# Patient Record
Sex: MALE | Race: WHITE | NOT HISPANIC OR LATINO | Employment: OTHER | URBAN - METROPOLITAN AREA
[De-identification: names, ages, dates, MRNs, and addresses within clinical notes are randomized per-mention and may not be internally consistent; named-entity substitution may affect disease eponyms.]

---

## 2024-12-23 ENCOUNTER — HOSPITAL ENCOUNTER (EMERGENCY)
Facility: HOSPITAL | Age: 69
Discharge: HOME | End: 2024-12-27
Attending: EMERGENCY MEDICINE
Payer: MEDICARE

## 2024-12-23 ENCOUNTER — APPOINTMENT (OUTPATIENT)
Dept: RADIOLOGY | Facility: HOSPITAL | Age: 69
End: 2024-12-23
Payer: MEDICARE

## 2024-12-23 ENCOUNTER — APPOINTMENT (OUTPATIENT)
Dept: CARDIOLOGY | Facility: HOSPITAL | Age: 69
End: 2024-12-23
Payer: MEDICARE

## 2024-12-23 DIAGNOSIS — R45.851 DEPRESSION WITH SUICIDAL IDEATION: Primary | ICD-10-CM

## 2024-12-23 DIAGNOSIS — F32.A DEPRESSION WITH SUICIDAL IDEATION: Primary | ICD-10-CM

## 2024-12-23 DIAGNOSIS — G89.29 OTHER CHRONIC PAIN: ICD-10-CM

## 2024-12-23 LAB
ALBUMIN SERPL BCP-MCNC: 4.4 G/DL (ref 3.4–5)
ALP SERPL-CCNC: 85 U/L (ref 33–136)
ALT SERPL W P-5'-P-CCNC: 17 U/L (ref 10–52)
ANION GAP SERPL CALC-SCNC: 15 MMOL/L (ref 10–20)
APAP SERPL-MCNC: <10 UG/ML
AST SERPL W P-5'-P-CCNC: 14 U/L (ref 9–39)
BASOPHILS # BLD AUTO: 0.09 X10*3/UL (ref 0–0.1)
BASOPHILS NFR BLD AUTO: 0.6 %
BILIRUB SERPL-MCNC: 0.2 MG/DL (ref 0–1.2)
BUN SERPL-MCNC: 10 MG/DL (ref 6–23)
CALCIUM SERPL-MCNC: 10.1 MG/DL (ref 8.6–10.3)
CHLORIDE SERPL-SCNC: 101 MMOL/L (ref 98–107)
CO2 SERPL-SCNC: 27 MMOL/L (ref 21–32)
CREAT SERPL-MCNC: 0.73 MG/DL (ref 0.5–1.3)
EGFRCR SERPLBLD CKD-EPI 2021: >90 ML/MIN/1.73M*2
EOSINOPHIL # BLD AUTO: 0.23 X10*3/UL (ref 0–0.7)
EOSINOPHIL NFR BLD AUTO: 1.5 %
ERYTHROCYTE [DISTWIDTH] IN BLOOD BY AUTOMATED COUNT: 15 % (ref 11.5–14.5)
ETHANOL SERPL-MCNC: <10 MG/DL
GLUCOSE SERPL-MCNC: 67 MG/DL (ref 74–99)
HCT VFR BLD AUTO: 46.2 % (ref 41–52)
HGB BLD-MCNC: 15.2 G/DL (ref 13.5–17.5)
IMM GRANULOCYTES # BLD AUTO: 0.09 X10*3/UL (ref 0–0.7)
IMM GRANULOCYTES NFR BLD AUTO: 0.6 % (ref 0–0.9)
LYMPHOCYTES # BLD AUTO: 4.11 X10*3/UL (ref 1.2–4.8)
LYMPHOCYTES NFR BLD AUTO: 27.3 %
MCH RBC QN AUTO: 28.5 PG (ref 26–34)
MCHC RBC AUTO-ENTMCNC: 32.9 G/DL (ref 32–36)
MCV RBC AUTO: 87 FL (ref 80–100)
MONOCYTES # BLD AUTO: 1.49 X10*3/UL (ref 0.1–1)
MONOCYTES NFR BLD AUTO: 9.9 %
NEUTROPHILS # BLD AUTO: 9.03 X10*3/UL (ref 1.2–7.7)
NEUTROPHILS NFR BLD AUTO: 60.1 %
NRBC BLD-RTO: 0 /100 WBCS (ref 0–0)
PLATELET # BLD AUTO: 510 X10*3/UL (ref 150–450)
POTASSIUM SERPL-SCNC: 3.9 MMOL/L (ref 3.5–5.3)
PROT SERPL-MCNC: 8.1 G/DL (ref 6.4–8.2)
RBC # BLD AUTO: 5.34 X10*6/UL (ref 4.5–5.9)
SALICYLATES SERPL-MCNC: <3 MG/DL
SARS-COV-2 RNA RESP QL NAA+PROBE: DETECTED
SODIUM SERPL-SCNC: 139 MMOL/L (ref 136–145)
WBC # BLD AUTO: 15 X10*3/UL (ref 4.4–11.3)

## 2024-12-23 PROCEDURE — 2500000001 HC RX 250 WO HCPCS SELF ADMINISTERED DRUGS (ALT 637 FOR MEDICARE OP): Performed by: NURSE PRACTITIONER

## 2024-12-23 PROCEDURE — 99285 EMERGENCY DEPT VISIT HI MDM: CPT | Mod: 25 | Performed by: EMERGENCY MEDICINE

## 2024-12-23 PROCEDURE — 87635 SARS-COV-2 COVID-19 AMP PRB: CPT | Performed by: NURSE PRACTITIONER

## 2024-12-23 PROCEDURE — 93005 ELECTROCARDIOGRAM TRACING: CPT

## 2024-12-23 PROCEDURE — 71046 X-RAY EXAM CHEST 2 VIEWS: CPT | Performed by: RADIOLOGY

## 2024-12-23 PROCEDURE — 80320 DRUG SCREEN QUANTALCOHOLS: CPT | Performed by: NURSE PRACTITIONER

## 2024-12-23 PROCEDURE — 80053 COMPREHEN METABOLIC PANEL: CPT | Performed by: NURSE PRACTITIONER

## 2024-12-23 PROCEDURE — 96372 THER/PROPH/DIAG INJ SC/IM: CPT | Performed by: EMERGENCY MEDICINE

## 2024-12-23 PROCEDURE — 71046 X-RAY EXAM CHEST 2 VIEWS: CPT

## 2024-12-23 PROCEDURE — 85025 COMPLETE CBC W/AUTO DIFF WBC: CPT | Performed by: NURSE PRACTITIONER

## 2024-12-23 PROCEDURE — 36415 COLL VENOUS BLD VENIPUNCTURE: CPT | Performed by: NURSE PRACTITIONER

## 2024-12-23 PROCEDURE — 2500000004 HC RX 250 GENERAL PHARMACY W/ HCPCS (ALT 636 FOR OP/ED): Performed by: EMERGENCY MEDICINE

## 2024-12-23 RX ORDER — HYDROXYZINE HYDROCHLORIDE 25 MG/1
25 TABLET, FILM COATED ORAL ONCE
Status: COMPLETED | OUTPATIENT
Start: 2024-12-23 | End: 2024-12-23

## 2024-12-23 RX ORDER — IBUPROFEN 600 MG/1
600 TABLET ORAL ONCE
Status: COMPLETED | OUTPATIENT
Start: 2024-12-23 | End: 2024-12-23

## 2024-12-23 RX ORDER — ACETAMINOPHEN 325 MG/1
975 TABLET ORAL ONCE
Status: COMPLETED | OUTPATIENT
Start: 2024-12-23 | End: 2024-12-23

## 2024-12-23 RX ORDER — HYDROMORPHONE HYDROCHLORIDE 1 MG/ML
1 INJECTION, SOLUTION INTRAMUSCULAR; INTRAVENOUS; SUBCUTANEOUS ONCE
Status: COMPLETED | OUTPATIENT
Start: 2024-12-23 | End: 2024-12-23

## 2024-12-23 RX ADMIN — HYDROMORPHONE HYDROCHLORIDE 1 MG: 1 INJECTION, SOLUTION INTRAMUSCULAR; INTRAVENOUS; SUBCUTANEOUS at 18:36

## 2024-12-23 RX ADMIN — HYDROXYZINE HYDROCHLORIDE 25 MG: 25 TABLET ORAL at 14:19

## 2024-12-23 RX ADMIN — ACETAMINOPHEN 975 MG: 325 TABLET ORAL at 13:23

## 2024-12-23 RX ADMIN — IBUPROFEN 600 MG: 600 TABLET, FILM COATED ORAL at 13:23

## 2024-12-23 SDOH — HEALTH STABILITY: MENTAL HEALTH: RISK OF SUICIDE: HIGH RISK

## 2024-12-23 SDOH — HEALTH STABILITY: MENTAL HEALTH: CONTENT: BLAMING SELF

## 2024-12-23 SDOH — HEALTH STABILITY: MENTAL HEALTH: BEHAVIORS/MOOD: ANXIOUS

## 2024-12-23 SDOH — SOCIAL STABILITY: SOCIAL NETWORK: VISITOR BEHAVIORS: UNABLE TO ASSESS

## 2024-12-23 SDOH — HEALTH STABILITY: MENTAL HEALTH: HAVE YOU EVER DONE ANYTHING, STARTED TO DO ANYTHING, OR PREPARED TO DO ANYTHING TO END YOUR LIFE?: YES

## 2024-12-23 SDOH — HEALTH STABILITY: MENTAL HEALTH: HAVE YOU BEEN THINKING ABOUT HOW YOU MIGHT DO THIS?: YES

## 2024-12-23 SDOH — HEALTH STABILITY: MENTAL HEALTH

## 2024-12-23 SDOH — HEALTH STABILITY: MENTAL HEALTH: NEEDS EXPRESSED: EMOTIONAL

## 2024-12-23 SDOH — SOCIAL STABILITY: SOCIAL INSECURITY: FAMILY BEHAVIORS: UNABLE TO ASSESS

## 2024-12-23 SDOH — HEALTH STABILITY: MENTAL HEALTH: HAVE YOU HAD THESE THOUGHTS AND HAD SOME INTENTION OF ACTING ON THEM?: YES

## 2024-12-23 SDOH — HEALTH STABILITY: MENTAL HEALTH: BEHAVIORS/MOOD: CALM;COOPERATIVE

## 2024-12-23 SDOH — HEALTH STABILITY: MENTAL HEALTH: BEHAVIORAL HEALTH(WDL): EXCEPTIONS TO WDL

## 2024-12-23 SDOH — HEALTH STABILITY: MENTAL HEALTH: SLEEP PATTERN: UNABLE TO ASSESS

## 2024-12-23 SDOH — HEALTH STABILITY: MENTAL HEALTH: BEHAVIORS/MOOD: AGITATED;ANXIOUS

## 2024-12-23 SDOH — ECONOMIC STABILITY: HOUSING INSECURITY: FEELS SAFE LIVING IN HOME: YES

## 2024-12-23 SDOH — HEALTH STABILITY: MENTAL HEALTH: HAVE YOU WISHED YOU WERE DEAD OR WISHED YOU COULD GO TO SLEEP AND NOT WAKE UP?: YES

## 2024-12-23 SDOH — HEALTH STABILITY: MENTAL HEALTH: ANXIETY SYMPTOMS: GENERALIZED

## 2024-12-23 SDOH — HEALTH STABILITY: MENTAL HEALTH: DELUSIONS: OTHER (COMMENT)

## 2024-12-23 SDOH — HEALTH STABILITY: MENTAL HEALTH: BEHAVIORS/MOOD: CALM;SAD;COOPERATIVE

## 2024-12-23 SDOH — HEALTH STABILITY: MENTAL HEALTH: CONTENT: UNREMARKABLE

## 2024-12-23 SDOH — HEALTH STABILITY: MENTAL HEALTH: SUICIDE ASSESSMENT: ADULT (C-SSRS)

## 2024-12-23 SDOH — HEALTH STABILITY: MENTAL HEALTH: WAS THIS WITHIN THE PAST THREE MONTHS?: YES

## 2024-12-23 SDOH — HEALTH STABILITY: MENTAL HEALTH
HAVE YOU STARTED TO WORK OUT OR WORKED OUT THE DETAILS OF HOW TO KILL YOURSELF? DO YOU INTENT TO CARRY OUT THIS PLAN?: YES

## 2024-12-23 SDOH — SOCIAL STABILITY: SOCIAL NETWORK: EMOTIONAL SUPPORT GIVEN: REASSURE

## 2024-12-23 SDOH — HEALTH STABILITY: MENTAL HEALTH: IN THE PAST FEW WEEKS, HAVE YOU WISHED YOU WERE DEAD?: YES

## 2024-12-23 SDOH — HEALTH STABILITY: MENTAL HEALTH: BEHAVIORS/MOOD: ANXIOUS;COOPERATIVE;PACING

## 2024-12-23 SDOH — HEALTH STABILITY: MENTAL HEALTH: HAVE YOU ACTUALLY HAD ANY THOUGHTS OF KILLING YOURSELF?: YES

## 2024-12-23 SDOH — SOCIAL STABILITY: SOCIAL NETWORK: EMOTIONAL SUPPORT GIVEN: PATIENT AND FAMILY COUNSELING

## 2024-12-23 SDOH — HEALTH STABILITY: MENTAL HEALTH
OTHER SUICIDE PRECAUTIONS INCLUDE: PATIENT PLACED IN AN EASILY OBSERVABLE ROOM WITH DOOR/CURTAIN REMAINING OPEN;PATIENT PLACED IN GOWN (SNAPS OR PAPER GOWNS PREFERRED) AND WANDED;PATIENT PLACED IN PSYCH SAFE ROOM (IF AVAILABLE);PROVIDER NOTIFIED;HOME MEDICATION LIST COLLECTED AND SHARED W

## 2024-12-23 SDOH — HEALTH STABILITY: MENTAL HEALTH: WISH TO BE DEAD (PAST 1 MONTH): YES

## 2024-12-23 SDOH — HEALTH STABILITY: MENTAL HEALTH: BEHAVIORS/MOOD: ANXIOUS;COOPERATIVE

## 2024-12-23 SDOH — HEALTH STABILITY: MENTAL HEALTH: FOR HIGH RISK PATIENTS: 1:1 PATIENT OBSERVER AT ALL TIMES

## 2024-12-23 SDOH — ECONOMIC STABILITY: GENERAL

## 2024-12-23 SDOH — HEALTH STABILITY: MENTAL HEALTH
DEPRESSION SYMPTOMS: APPETITE CHANGE;CHANGE IN ENERGY LEVEL;FEELINGS OF HELPLESSNESS;FEELINGS OF HOPELESSESS;FEELINGS OF WORTHLESSNESS;INCREASED IRRITABILITY;ISOLATIVE;LOSS OF INTEREST;SLEEP DISTURBANCE

## 2024-12-23 ASSESSMENT — LIFESTYLE VARIABLES
TOTAL SCORE: 0
EVER FELT BAD OR GUILTY ABOUT YOUR DRINKING: NO
PRESCIPTION_ABUSE_PAST_12_MONTHS: NO
HAVE YOU EVER FELT YOU SHOULD CUT DOWN ON YOUR DRINKING: NO
HAVE PEOPLE ANNOYED YOU BY CRITICIZING YOUR DRINKING: NO
EVER HAD A DRINK FIRST THING IN THE MORNING TO STEADY YOUR NERVES TO GET RID OF A HANGOVER: NO
SUBSTANCE_ABUSE_PAST_12_MONTHS: NO

## 2024-12-23 ASSESSMENT — PAIN - FUNCTIONAL ASSESSMENT
PAIN_FUNCTIONAL_ASSESSMENT: 0-10

## 2024-12-23 ASSESSMENT — PAIN DESCRIPTION - LOCATION
LOCATION: BACK

## 2024-12-23 ASSESSMENT — PAIN DESCRIPTION - PROGRESSION: CLINICAL_PROGRESSION: NOT CHANGED

## 2024-12-23 ASSESSMENT — COLUMBIA-SUICIDE SEVERITY RATING SCALE - C-SSRS
4. HAVE YOU HAD THESE THOUGHTS AND HAD SOME INTENTION OF ACTING ON THEM?: YES
2. HAVE YOU ACTUALLY HAD ANY THOUGHTS OF KILLING YOURSELF?: YES
6. HAVE YOU EVER DONE ANYTHING, STARTED TO DO ANYTHING, OR PREPARED TO DO ANYTHING TO END YOUR LIFE?: YES
5. HAVE YOU STARTED TO WORK OUT OR WORKED OUT THE DETAILS OF HOW TO KILL YOURSELF? DO YOU INTEND TO CARRY OUT THIS PLAN?: YES
6. HAVE YOU EVER DONE ANYTHING, STARTED TO DO ANYTHING, OR PREPARED TO DO ANYTHING TO END YOUR LIFE?: YES
1. IN THE PAST MONTH, HAVE YOU WISHED YOU WERE DEAD OR WISHED YOU COULD GO TO SLEEP AND NOT WAKE UP?: YES

## 2024-12-23 ASSESSMENT — PAIN DESCRIPTION - PAIN TYPE
TYPE: CHRONIC PAIN

## 2024-12-23 ASSESSMENT — PAIN DESCRIPTION - ORIENTATION: ORIENTATION: MID

## 2024-12-23 ASSESSMENT — PAIN SCALES - GENERAL
PAINLEVEL_OUTOF10: 7
PAINLEVEL_OUTOF10: 8
PAINLEVEL_OUTOF10: 10 - WORST POSSIBLE PAIN
PAINLEVEL_OUTOF10: 6

## 2024-12-23 NOTE — ED TRIAGE NOTES
Pt. Arrived to the ED via EMS for Suicidal ideation. Pt. Was at [facility] urgent care he experienced that he has a plan to cut his throat. He states he would go to the fire station and ( Do what I need to do). Pt. States he has a past history of multiple suicide attempts. Pt. Also states thatt he has not been able to sleep recently

## 2024-12-23 NOTE — ED PROVIDER NOTES
Chief Complaint   Patient presents with    Suicidal       HPI       69 year old male presents to the Emergency Department today complaining of suicidal ideation. Was speaking with his psychiatric care provider and mentioned that he has been having suicidal thoughts with the plan to slit his throat. Reports that his insomnia has triggered increasing depression and these thoughts. Denies any associated fever, chills, headache, neck pain, chest pain, shortness of breath, abdominal pain, nausea, vomiting, diarrhea, constipation, or urinary symptoms.       History provided by:  Patient             Patient History   No past medical history on file.  No past surgical history on file.  No family history on file.  Social History     Tobacco Use    Smoking status: Not on file    Smokeless tobacco: Not on file   Substance Use Topics    Alcohol use: Not on file    Drug use: Not on file           Physical Exam  Constitutional:       Appearance: Normal appearance.   HENT:      Head: Normocephalic.      Right Ear: External ear normal.      Left Ear: External ear normal.      Nose: Nose normal.      Mouth/Throat:      Mouth: Mucous membranes are moist.      Pharynx: Oropharynx is clear. No oropharyngeal exudate or posterior oropharyngeal erythema.   Eyes:      Conjunctiva/sclera: Conjunctivae normal.      Pupils: Pupils are equal, round, and reactive to light.   Cardiovascular:      Rate and Rhythm: Normal rate and regular rhythm.      Pulses:           Radial pulses are 3+ on the right side and 3+ on the left side.        Dorsalis pedis pulses are 3+ on the right side and 3+ on the left side.      Heart sounds: Normal heart sounds. No murmur heard.     No friction rub. No gallop.   Pulmonary:      Effort: Pulmonary effort is normal. No respiratory distress.      Breath sounds: Normal breath sounds. No wheezing, rhonchi or rales.   Abdominal:      General: Abdomen is flat. Bowel sounds are normal.      Palpations: Abdomen is soft.       Tenderness: There is no abdominal tenderness. There is no right CVA tenderness, left CVA tenderness, guarding or rebound. Negative signs include Gamboa's sign and McBurney's sign.   Musculoskeletal:         General: No swelling or deformity.      Cervical back: Full passive range of motion without pain.      Right lower leg: No edema.      Left lower leg: No edema.   Lymphadenopathy:      Cervical: No cervical adenopathy.   Skin:     Capillary Refill: Capillary refill takes less than 2 seconds.      Coloration: Skin is not jaundiced.      Findings: No rash.   Neurological:      General: No focal deficit present.      Mental Status: He is alert and oriented to person, place, and time. Mental status is at baseline.      Gait: Gait is intact.   Psychiatric:         Mood and Affect: Mood normal.         Behavior: Behavior is cooperative.         Labs Reviewed - No data to display    No orders to display            ED Course & MDM   ED Course as of 12/23/24 2134   Mon Dec 23, 2024   1133 Noted to have an elevated WBC count. CXR ordered.  [JZ]      ED Course User Index  [JZ] ELENA Chang-CNP         Diagnoses as of 12/23/24 2134   Depression with suicidal ideation           Medical Decision Making  EKG interpreted by Dr. Zimmerman. Indication: medical clearance. Findings: NSR with a ventricular rate of 85, normal axis, normal intervals, and no acute ischemic or injury pattern. Impression: No acute pathology.       Patient was seen and evaluated by Dr. Zimmerman. Pink slip precautions were maintained throughout his ED stay. Given Tylenol and Motrin for his chronic pain. Dr. Zimmerman later ordered him an injection of Dilaudid. Given Vistaril for his anxiety. He was intermittently agitated initially, but seemed to calm down over time. Noted to have an elevated WBC count of 15.0. Remainder of blood counts, electrolytes, liver function, and kidney function were unremarkable. Blood toxicology was negative Awaiting urine  for urinalysis and drug tox. CXR showed no acute cardiopulmonary process radiographically. COVID was positive. EKG showed no acute pathology. We find no underlying medical emergency that would prevent further psychiatric evaluation and care. Therefore, the patient is medically cleared. EPAT was consulted for such.     Diagnostic Impression:    1. Depression with suicidal ideation           Your medication list      You have not been prescribed any medications.           Procedure  Procedures     Anuel Harley, ELENA-CIRO  12/23/24 2152       ELENA Chang-CIRO  12/23/24 2156

## 2024-12-24 PROCEDURE — 2500000002 HC RX 250 W HCPCS SELF ADMINISTERED DRUGS (ALT 637 FOR MEDICARE OP, ALT 636 FOR OP/ED): Performed by: STUDENT IN AN ORGANIZED HEALTH CARE EDUCATION/TRAINING PROGRAM

## 2024-12-24 RX ORDER — DOXEPIN HYDROCHLORIDE 25 MG/1
50 CAPSULE ORAL ONCE
Status: COMPLETED | OUTPATIENT
Start: 2024-12-24 | End: 2024-12-24

## 2024-12-24 RX ADMIN — DOXEPIN HYDROCHLORIDE 50 MG: 25 CAPSULE ORAL at 05:24

## 2024-12-24 SDOH — HEALTH STABILITY: MENTAL HEALTH

## 2024-12-24 SDOH — HEALTH STABILITY: MENTAL HEALTH: NEEDS EXPRESSED: EMOTIONAL

## 2024-12-24 SDOH — HEALTH STABILITY: MENTAL HEALTH: BEHAVIORAL HEALTH(WDL): WITHIN DEFINED LIMITS

## 2024-12-24 SDOH — HEALTH STABILITY: MENTAL HEALTH: IN THE PAST FEW WEEKS, HAVE YOU WISHED YOU WERE DEAD?: YES

## 2024-12-24 SDOH — SOCIAL STABILITY: SOCIAL INSECURITY: FAMILY BEHAVIORS: UNABLE TO ASSESS

## 2024-12-24 SDOH — SOCIAL STABILITY: SOCIAL NETWORK: VISITOR BEHAVIORS: UNABLE TO ASSESS

## 2024-12-24 SDOH — HEALTH STABILITY: MENTAL HEALTH: SLEEP PATTERN: NAPS DURING THE DAY

## 2024-12-24 SDOH — HEALTH STABILITY: MENTAL HEALTH: NEEDS EXPRESSED: DENIES

## 2024-12-24 SDOH — HEALTH STABILITY: MENTAL HEALTH: SLEEP PATTERN: DIFFICULTY FALLING ASLEEP

## 2024-12-24 SDOH — HEALTH STABILITY: MENTAL HEALTH: HAVE YOU ACTUALLY HAD ANY THOUGHTS OF KILLING YOURSELF?: YES

## 2024-12-24 SDOH — HEALTH STABILITY: MENTAL HEALTH: BEHAVIORS/MOOD: COOPERATIVE;SAD

## 2024-12-24 SDOH — HEALTH STABILITY: MENTAL HEALTH: HAVE YOU HAD THESE THOUGHTS AND HAD SOME INTENTION OF ACTING ON THEM?: NO

## 2024-12-24 SDOH — HEALTH STABILITY: MENTAL HEALTH: CONTENT: UNREMARKABLE

## 2024-12-24 SDOH — HEALTH STABILITY: MENTAL HEALTH
HAVE YOU STARTED TO WORK OUT OR WORKED OUT THE DETAILS OF HOW TO KILL YOURSELF? DO YOU INTENT TO CARRY OUT THIS PLAN?: NO

## 2024-12-24 SDOH — HEALTH STABILITY: MENTAL HEALTH: BEHAVIORAL HEALTH(WDL): EXCEPTIONS TO WDL

## 2024-12-24 SDOH — HEALTH STABILITY: MENTAL HEALTH: BEHAVIORS/MOOD: SLEEPING

## 2024-12-24 SDOH — HEALTH STABILITY: MENTAL HEALTH: RISK OF SUICIDE: HIGH RISK

## 2024-12-24 SDOH — HEALTH STABILITY: MENTAL HEALTH: WAS THIS WITHIN THE PAST THREE MONTHS?: YES

## 2024-12-24 SDOH — SOCIAL STABILITY: SOCIAL NETWORK: EMOTIONAL SUPPORT GIVEN: REASSURE

## 2024-12-24 SDOH — HEALTH STABILITY: MENTAL HEALTH: SUICIDE ASSESSMENT: ADULT (C-SSRS)

## 2024-12-24 SDOH — HEALTH STABILITY: MENTAL HEALTH: HAVE YOU BEEN THINKING ABOUT HOW YOU MIGHT DO THIS?: YES

## 2024-12-24 SDOH — HEALTH STABILITY: MENTAL HEALTH: HAVE YOU EVER DONE ANYTHING, STARTED TO DO ANYTHING, OR PREPARED TO DO ANYTHING TO END YOUR LIFE?: YES

## 2024-12-24 SDOH — HEALTH STABILITY: MENTAL HEALTH: HAVE YOU WISHED YOU WERE DEAD OR WISHED YOU COULD GO TO SLEEP AND NOT WAKE UP?: YES

## 2024-12-24 SDOH — HEALTH STABILITY: MENTAL HEALTH: SLEEP PATTERN: UNABLE TO ASSESS

## 2024-12-24 SDOH — HEALTH STABILITY: MENTAL HEALTH
OTHER SUICIDE PRECAUTIONS INCLUDE: PATIENT PLACED IN AN EASILY OBSERVABLE ROOM WITH DOOR/CURTAIN REMAINING OPEN;PATIENT PLACED IN GOWN (SNAPS OR PAPER GOWNS PREFERRED) AND WANDED;PATIENT PLACED IN PSYCH SAFE ROOM (IF AVAILABLE);FREQUENT ROUNDING WITH IRREGULAR CHECKS AT MINIMUM OF EVERY 1

## 2024-12-24 SDOH — HEALTH STABILITY: MENTAL HEALTH: FOR HIGH RISK PATIENTS: ALL INTERVENTIONS ABOVE, PLUS:;1:1 PATIENT OBSERVER AT ALL TIMES

## 2024-12-24 SDOH — HEALTH STABILITY: MENTAL HEALTH: FOR HIGH RISK PATIENTS: 1:1 PATIENT OBSERVER AT ALL TIMES

## 2024-12-24 SDOH — HEALTH STABILITY: MENTAL HEALTH: SLEEP PATTERN: INSOMNIA

## 2024-12-24 SDOH — HEALTH STABILITY: MENTAL HEALTH: DELUSIONS: OTHER (COMMENT)

## 2024-12-24 SDOH — HEALTH STABILITY: MENTAL HEALTH: BEHAVIORS/MOOD: COOPERATIVE;CALM

## 2024-12-24 SDOH — HEALTH STABILITY: MENTAL HEALTH: BEHAVIORS/MOOD: CALM;COOPERATIVE

## 2024-12-24 ASSESSMENT — COLUMBIA-SUICIDE SEVERITY RATING SCALE - C-SSRS
6. HAVE YOU EVER DONE ANYTHING, STARTED TO DO ANYTHING, OR PREPARED TO DO ANYTHING TO END YOUR LIFE?: NO
1. SINCE LAST CONTACT, HAVE YOU WISHED YOU WERE DEAD OR WISHED YOU COULD GO TO SLEEP AND NOT WAKE UP?: YES
2. HAVE YOU ACTUALLY HAD ANY THOUGHTS OF KILLING YOURSELF?: NO

## 2024-12-24 NOTE — PROGRESS NOTES
Emergency Medicine Transition of Care Note.    I received Herbert Keane in signout from Dr. Zimmerman.  Please see the previous ED provider note for all HPI, PE and MDM up to the time of signout at 00:30. This is in addition to the primary record.    In brief Herbert Keane is an 69 y.o. male presenting for   Chief Complaint   Patient presents with    Suicidal     At the time of signout we were awaiting: Placement    ED Course as of 12/24/24 0534   Mon Dec 23, 2024   1133 Noted to have an elevated WBC count. CXR ordered.  [JZ]   2307 [facility] for his suicidal ideation with plan [SG]      ED Course User Index  [JZ] Anuel Harley, APRN-CNP  [SG] Susanna Zimmerman MD         Diagnoses as of 12/24/24 0534   Depression with suicidal ideation       Medical Decision Making  Patient was signed out to me pending placement by EPAT.  During my shift the patient remained calm and cooperative.  He was requesting something to help him sleep and so doxepin was ordered.  At the time of signout the patient is pending placement    Final diagnoses:   [F32.A, R45.851] Depression with suicidal ideation           Procedure  Procedures    Joao Miranda MD

## 2024-12-24 NOTE — PROGRESS NOTES
"EPAT - Social Work Psychiatric Assessment    Arrival Details  Mode of Arrival: Ambulance  Admission Source: Home  Admission Type: Involuntary  EPAT Assessment Start Date: 12/23/24  EPAT Assessment Start Time: 2131  Name of : ANIBAL Simmons    History of Present Illness  ED Admission Reason: Psychiatric assessment ordered for suicidal risk  HPI: A review of previous and current electronic records was conducted prior to the patient interview. EMR was quite limited, only showing prescription history and brief outpatient psychiatric notes. No history of past suicidal attempts, but does show one expert evaluation before an inpatient hospitalization for suicidal ideation 11/4/19 (details in summary section).   Circumstance in which the patient has presented to the ED and initial clinical impressions as documented in the ED Provider Note upon admission:  69 year old male presents to the Emergency Department today complaining of suicidal ideation. Was speaking with his psychiatric care provider [Meryl PARKER ]and mentioned that he has been having suicidal thoughts with the plan to slit his throat. Reports that his insomnia has triggered increasing depression and these thoughts.   Medical Decision Making provider section of note: Given Vistaril for his anxiety. He was intermittently agitated initially, but seemed to calm down over time. Behavior: Behavior is cooperative.    RN note upon admission: Pt. Arrived to the ED via EMS for Suicidal ideation. Pt. Was at [facility] urgent care he experienced that he has a plan to cut his throat. He states he would go to the fire station and \"Do what I need to do.\" Pt. States he has a past history of multiple suicide attempts. Pt. Also states that he has not been able to sleep recently.    SW Readmission Information   Readmission within 30 Days: No    Psychiatric Symptoms    Generalized Anxiety Disorder: Difficult to control worry, Difficulity concentrating, Excessive " "anxiety/worry  Depression Symptoms: Dysthymia, Thoughts about death, Appetite change, Change in energy level, Feelings of helplessness, Feelings of hopelessess, Feelings of worthlessness, Increased irritability, Isolative, Loss of interest, Sleep disturbance  Delirium: No problems reported or observed.  Hallucination Type: No problems reported or observed.  Delusion Type: No problems reported or observed.    Review of Symptoms, Comments: My question to him for mood details: Tell me about your depression. Answer: \"I don't do anything all day. I can't sleep at night. I can't eat. I'm hopeless, helpless.\" Feeling of worthlessness is also implied by his statements, \"No one cares about me. I make calls to people and no one calls back.\" \"I feel trapped in my room like a rat.\" \"I'm just an old . 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 Now I have arthritis in my back and I'm worthless.\" Details about not eating: He said it's not neasea, but he has no appetite. Details: He said he had 4 pieces of cheese yesterday, and had nothing of what the ED offered him today. He said he is losing weight. Last time he remembers eating anything was Thanksgiving. His comment on sleep: \"I used to be sleepless for 5 or 7 days, then get 4 or 5 hours sleep finally. But I can't remember the last time I slept at all.\"     Past Psychiatric History/Meds/Treatments    Past Psychiatric History   Previous Psychiatric Inpatient Hospitalizations: Only inpt psych admission know is 11/4/19. The records are quite limited and he would not admit to more.  Previous Substance Abuse Treatment: None known.    Past Psychiatric Meds    There are discrepcies between the records I viewed and what the patient is telling me. This case will require O[facility] and/or contacting the outpatient provider's office (Meryl PARKER, phone in section below). His pharmacy contact info is also availabe through the provider or the daughter (she is the emergency contact on " "registration page and at the end of this document).     Prescription history tab listed the following mood meds (and dates ordered):  desvenlafaxine (Pristiq) 50 mg 24 hr tablet 10/15/2024   traZODone (Desyrel) 150 mg tablet 10/15/2024   traZODone (Desyrel) 100 mg tablet, take 1 tablet by mouth nightly  02/19/2024   Remeron SolTab 45 mg disintegrating tablet, 1 tablet on the tongue and allow to dissolve at bedtime Orally 10/14/2021  Remeron SolTab 15 mg disintegrating tablet, 1 tablet on the tongue and allow to dissolve at bedtime Orally 11/11/2024  QUEtiapine (SEROquel) 100 MG tablet, Take 1 tablet (100 mg) by mouth Nightly. 12/09/2024  QUEtiapine (SeroqueL) 400 mg tablet, 2 tablets at bedtime Orally Once a day  10/14/2021  OLANZapine zydis (ZyPREXA) 20 mg disintegrating tablet, 1 tablet on the tongue and allow to dissolve at bedtime Orally 08/19/2024  OLANZapine (ZyPREXA) 10 mg tablet 05/07/2024    Prescription refill note (epic 7/17/24):  desvenlafaxine (Pristiq) 50 MG 24 hr tablet daily  traZODone (Desyrel) 150 MG tablet nightly  QUEtiapine (SEROquel) 50 MG tablet, 1/2 tab nightly for 7 nights, then 1 tab nightly thereafter    from rx history list, he is also recently prescribed Remeron 15 and Zyprexa 20.     Patient comments on his psychotropics, this interview:   re Remeron, he did recite the dosage correctly as 15mg. His comments: \"I felt better way back when it was 70mg. She's young and afraid to make a mistake.\" I don't see where it was 70mg, but it shows as 45mg in Oct 2021.   re Zyprexa: Patient did not comment. It appears to be 10mg in May, then 20mg in Aug.    re desvenlafaxine (Pristiq), he didn't recognize it by name. \"I don't take everything she prescribes. I just don't tell her.\"   re traZODone (Desyrel), he said it didn't work at all, so he doesn't take it. He's not sleeping, so I asked if he has told his doctor. Response: \"I try not to whine about it.\" Trazadone was 100 in Feb, then 150 as of " Oct.   re QUEtiapine (SEROquel), the July record shows 50mg, but appears raised to 100mg as of this month (Nov 2024). He did recite seroquel 100mg accurately as current. At night, perhaps to help sleepiness, but apparently not working. Patient comment: He said she is too conservative with her prescribing, that he felt fine back in the day when he took 800mg seroquel (listed in history as 800 in Oct 2021)    Current Providers  Psychiatry and PCP are at the same clinic:  [address]  [address]  [place] [address]    Nurse Practitioner Psychiatric   Meryl Kwan, APRN - CNP   [phone] (Work)  [phone] (Fax)    Family Medicine  Emerita Botello MD   [phone] (Work)  [phone] (Fax)    Social/Cultural History    Social History: [place].  Guardian/POA: No but his daughter is listed as his emergency contact and has permission to be notified on admission and permission as authorized letter recipient. I did leave a follow-up voicemail with her that he is in the ED waiting on psychiatric admission.  Current Stressors: None specified except for chronic pain and his severe mood disorder symptoms.   Cultural Requests During Hospitalization: None  Spiritual Requests During Hospitalization: None    Support System: Immediate family    Living Arrangement: House. Lives with his daughter, her  and their 3 children.  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   Income/Expense Information: Income meets expenses  Financial Concerns: None    [place] Service/Education History  Current or Previous  Service: None    Legal Concerns: None    Drug Screening  Have you used any substances (canabis, cocaine, heroin, hallucinogens, inhalants, etc.) in the past 12 months?: No  Have you used any prescription drugs other than prescribed in the past 12  "months?: No  Is a toxicology screen needed?: Yes (per protocol)   The drug screen has not been processed by lab at this time, but the patient denies alcohol or drug use;  and there is no indication of use history in records.    Mental Status Exam   - Orientation: Patient is oriented to day, time, person, place. He is oriented to the situation, knowing he is in the ED because of suicidal statements he made to his psychiatrist this morning.    - Appearance/Behaviour: Appearance unremarkable except for unkempt hair, looking his age, dressed in hospital gown, sitting on bed, interacting with video chat. He was    - Mood: Depressed. Record indicates that he is a daily smoker and there is no indication of nicotine patch, so he may be feeling withdrawal at this point.    - Affect: Flat, except adamant that he does not belong in the ED.   - Speech: Low and soft tone, normal pace.    - Perception (Auditory/Visual Hallucinations): No hallucinations admitted and none discovered in records review. No indication of reactions to any internal stimuli.    - Thought Content (Suicidal/Homicidal Ideation) and Process: Suicidal thinking as documented, although defensive about it during psychiatric assessment interview. No HI indicated or fournd in records review. No indications of delusions. Lack of any loose associations or tangentiality. Was not expansive in responses.    - Cognition: His fund of knowledge was actually not bad. He was unhappy about having been in the ED all day by the time I spoke with him at 10pm. He said, \"I've been here since 10am and have had a 15 minute conversation with a doctor.\" As accuracy check, his admission timeline shows that he has been in the ED since 10am. he was also able to name and recite a couple of his psychotropic medications accurately and even referenced an historical dosage from years ago accurately. There was a concern about his memory documented on 6/14/23. Physician note that admission: " "\"Daughter mentioned that patient has had periods of confusion in the last 3 weeks prior, although improved over the past week. Confusion would come and go and is brief in nature. Stated that prior to those 3 weeks, he had some of the same symptoms periodically for a couple weeks. Some speech issue accompanied, but that also resolved on its own. In short, he does not seem to have dementia or any noted long-term memory issues.    - Insight and Judgement: Poor insight, saying he takes all his medications as prescribed and then admits that he doesn't. Judgement is also limited, admitting suicidal ideation and stated specific potential method, then completely denied that he has any SI and even said that he has never had a suicidal thought in his lifetime. His record shows that he has had suicidal ideations intermittently over the years. Nursing note shows that he admits to several episodes of suicidal ideation, even saying \"attempts.\" His denial in the psychiatric assessment interview with me was always accompanied with the statement that he said too much this morning and wants to get discharged home. This contradictory presentation is not memory or psychosis. He is guarded and attempting to manipulate the outcome of this visit.     Risk Factors    Self Harm/Suicidal Ideation Plan: Statement this morning, To cut his throat  Risk Factors: Male, Major mental illness  Description of Thoughts/Ideas Leaving Unit Now: He is now denying that discharge would be risk; Defensive; Guarded    Violence Risk Assessment  Assessment of Violence: On admission  Thoughts of Harm to Others: No; and no indication of aggression history currently or in records.     Suicide Risk Screen, Ability to Assess:    Unable to assess by means of interview. Reason: He chose to deny the presence and any history of suicidal ideation at the time of this psychiatric assessment interview.    Ask Suicide-Screening Questions   Risk questions regarding the past " few weeks:  Patient refused to answer during this psychiatric assessment interview    [place] Suicide Severity Rating Scale (Screener/Recent Self-Report)   Risk questions regarding the past month:  Patient refused to answer during this psychiatric assessment interview    Step 1: Risk Factors  Current & Past Psychiatric Dx: Mood disorder  Presenting Symptoms: Anhedonia, Hopelessness or despair, Anxiety and/or panic, Insomia  Precipitants/Stressors: Triggering events leading to humiliation, shame, and/or despair (e.g. loss of relationship, financial or health status) (real or anticipated); Chronic pain  Change in Treatment: Not certain of compliance to all psychotropics.    Step 2: Protective Factors   Protective Factors Internal: Patient refused to answer during this psychiatric assessment interview  Protective Factors External:  Patient refused to answer during this psychiatric assessment interview    Step 3: Suicidal Ideation Intensity  Most Severe Suicidal Ideation Identified: Most severe suicidal ideation known at this point is his statement about cutting his throat  Frequency and Intensity of thoughts and intentions:  Patient refused to answer during this psychiatric assessment interview    Clinical Documentation on Risk Level:    Nominal questions on the presence of SI over the past few weeks or month were refused, let alone intensity questions on frequency and intent. However, the patient may be clinically judged as moderate risk based on his earlier admission of suicidal ideation, documentation that he has been hospitalized for SI in the past and that his psychiatric provider called for transport to ED this morning based on the seriousness of his suicidal statement that impressed her as exceptional from his baseline non-specific suicidal ideations.     Psychiatric Impression and Plan of Care    Assessment and Plan:    DIAGNOSTIC IMPRESSION based upon previous and current evaluative information:  Diagnostic  "from psychiatry provider (Meryl PARKER), documented 7/17/24 as follows.  Bipolar 1 disorder (HCC), Generalized anxiety disorder    ASSESSMENT NOTES: 20 min records review; 30 min patient interview by telemedicine video interview.   The factors that made this assessment challenging were the lack of information on history of suicidality and the reason his psychiatrist gives the diagnosis of bipolar. His manic past is unknown, and whether he has actually had suicide attempts is unknown. The EMR was quite limited.    Factors that tipped the scale toward the decision to admit were his explicit words about using a knife to cut his throat and then adamantly denying that he meant it and saying that he has never had a suicidal thought ever in his lifetime. The only indications of suicidality is having responded a couple times, \"I said too much to my psychiatrist this morning.\" Alternating with total denial of SI, his response to having SI now or in the past: \"I don't want to talk about it.\" Before totally denying any SI history or presence, I asked why he said something about cutting his throat today. His only response was, \"There are lots of ways to kill yourself\" and offered nothing more. His initial interview by admitting nurse in triage shows all affirmative responses to the [place] risk inventory, yeilding high risk.    Another major factor was the the severity of mood disorder symptoms that he did admit to. He admits to dysthymia and a sense of hopelessness, helplessness and worthlessness. He is not eating. He is losing weight. His insomnia is quite severe. He is not sleeping. He does not report his lack of responsiveness to sleep medication to psychiatry. He was fairly good at knowing his medication, but does not recall specifically taking the antidepressant.    Lack of collatoral information: His daughter did visit him and was at bedside for part of the day, but was not available by phone for collateral " "information, having given her a couple hours to respond the to the voicemail to call EPAT.    Inability to feel cared for: He admits to getting along fine with the family he lives with - daughter, son-in-law and their 3 children - but then says that no one cares about him.    Also used in decision to admit the patient was a comparison of his current presentation to the statement of expert evaluation made by a psychiatrist that did hospitalize him once. I found the scan of the statement from 11/4/19: \"Daily SI with methods, vague intent, unable to make safety plan. Decrease in sleep and appetite. Stated, 'I am at the end of my rope' \" Very similar to his current condition.    EMS document (scanned into record) from this morning states, \"The outpatient psychiatry provider [Meryl PARKER] stated that the patient made suicidal statement, that he has had thoughts of suicide before, but this time it is very detailed.\"   ED Provider statement in HPI: \"Patient was speaking with his psychiatric care provider and mentioned that he has been having suicidal thoughts with the plan to slit his throat.\"   No HI. No apparent psychotic features.    COLLATERAL information interviewing: Attempted to reach the daughter by phone, unsuccesfully.      CONTRIBUTING factors to this ED visit that were present in prior assessments: Unknown, but he has chronic pain and may not be taking all psychotropics as prescribed.     THRIVE services considered to address substance use: n/a    AGITATION Assessment: Is patient presenting as agitated? He was irritated with the process at times, but did not demonstrate agitation.      PLAN OF CARE: ADMISSION. Following the psychiatric assessment, EPAT consulted with the ED Provider Susanna Zimmerman MD , concurring that the patient does meet criteria for emergency certificate per OR 5122.10.  Specific Resources Provided to Patient: Deferred to discharge planning team  CM Notified: " n/a    Outcome/Disposition - Admission  Patient's Perception of Outcome Achieved: Patient has no insight into his illness and wants to be discharged home  Disposition: Adult Inpatient Psychiatric Referral   Assessment, Recommendations and Risk Level Reviewed with: Susanna Zimmerman MD  Emergency Contact Name: Christiano Tran (Child)  [phone]  EPAT Assessment Completed Date: 12/23/24  EPAT Assessment Completed Time: 2225

## 2024-12-24 NOTE — PROGRESS NOTES
Emergency Medicine Transition of Care Note.    I received Herbert Keane in signout from Dr. Goldberg.  Please see the previous ED provider note for all HPI, PE and MDM up to the time of signout at 0700. This is in addition to the primary record.    In brief Herbert Keane is an 69 y.o. male presenting for   Chief Complaint   Patient presents with    Suicidal     At the time of signout we were awaiting: EPAT placement    ED Course as of 12/29/24 0723   Mon Dec 23, 2024   1133 Noted to have an elevated WBC count. CXR ordered.  [JZ]   2307 Patient seen by EPAT who recommends inpatient stabilization for his suicidal ideation with plan [SG]   Wed Dec 25, 2024   0533 EPAT called with clarifying questions.  Patient has some chronic wounds on his left lateral thigh.  Those are healing well, no signs of any active cellulitis, no drainage or signs of purulence.  Patient is requesting pain medication.  Patient be given IM injection of hydromorphone.  Will also give steroids as well for his back pain and COVID. [JH]   1850 Patient slipped on water in his room.  Says he hurt his left arm.  He is unsure if he hit his head.  No chest pain, shortness of breath abdominal pain or neck pain.  Will obtain CT brain, C-spine and x-ray of the left shoulder and humerus [RS]      ED Course User Index  [JH] Torsten Montano MD  [JZ] Anuel Harley, APRN-CNP  [RS] Hawk Lee DO  [SG] Susanna Zimmerman MD         Diagnoses as of 12/29/24 0723   Depression with suicidal ideation   Other chronic pain       Medical Decision Making    69-year-old male presented ED with suicidal ideations.  Workup was significant for COVID-19 positive.  At signout we are pending placement.  No acute changes having patient's clinical status while his caregiver.  At this point time patient be signed out to oncoming provider awaiting psychiatric placement.    Final diagnoses:   [F32.A, R45.851] Depression with suicidal ideation            Procedure  Procedures    Hawk Lee DO

## 2024-12-25 ENCOUNTER — APPOINTMENT (OUTPATIENT)
Dept: RADIOLOGY | Facility: HOSPITAL | Age: 69
End: 2024-12-25
Payer: MEDICARE

## 2024-12-25 ENCOUNTER — DOCUMENTATION (OUTPATIENT)
Dept: BEHAVIORAL HEALTH | Facility: HOSPITAL | Age: 69
End: 2024-12-25
Payer: MEDICARE

## 2024-12-25 LAB
AMPHETAMINES UR QL SCN: NORMAL
APPEARANCE UR: CLEAR
BARBITURATES UR QL SCN: NORMAL
BENZODIAZ UR QL SCN: NORMAL
BILIRUB UR STRIP.AUTO-MCNC: NEGATIVE MG/DL
BZE UR QL SCN: NORMAL
CANNABINOIDS UR QL SCN: NORMAL
COLOR UR: YELLOW
FENTANYL+NORFENTANYL UR QL SCN: NORMAL
GLUCOSE BLD MANUAL STRIP-MCNC: 150 MG/DL (ref 74–99)
GLUCOSE UR STRIP.AUTO-MCNC: NORMAL MG/DL
HOLD SPECIMEN: NORMAL
KETONES UR STRIP.AUTO-MCNC: ABNORMAL MG/DL
LEUKOCYTE ESTERASE UR QL STRIP.AUTO: NEGATIVE
METHADONE UR QL SCN: NORMAL
NITRITE UR QL STRIP.AUTO: NEGATIVE
OPIATES UR QL SCN: NORMAL
OXYCODONE+OXYMORPHONE UR QL SCN: NORMAL
PCP UR QL SCN: NORMAL
PH UR STRIP.AUTO: 7 [PH]
PROT UR STRIP.AUTO-MCNC: NEGATIVE MG/DL
RBC # UR STRIP.AUTO: NEGATIVE /UL
SP GR UR STRIP.AUTO: 1.02
UROBILINOGEN UR STRIP.AUTO-MCNC: NORMAL MG/DL

## 2024-12-25 PROCEDURE — 2500000004 HC RX 250 GENERAL PHARMACY W/ HCPCS (ALT 636 FOR OP/ED): Performed by: STUDENT IN AN ORGANIZED HEALTH CARE EDUCATION/TRAINING PROGRAM

## 2024-12-25 PROCEDURE — S4991 NICOTINE PATCH NONLEGEND: HCPCS | Performed by: STUDENT IN AN ORGANIZED HEALTH CARE EDUCATION/TRAINING PROGRAM

## 2024-12-25 PROCEDURE — 96374 THER/PROPH/DIAG INJ IV PUSH: CPT

## 2024-12-25 PROCEDURE — 2500000004 HC RX 250 GENERAL PHARMACY W/ HCPCS (ALT 636 FOR OP/ED): Performed by: EMERGENCY MEDICINE

## 2024-12-25 PROCEDURE — 73060 X-RAY EXAM OF HUMERUS: CPT | Mod: LT

## 2024-12-25 PROCEDURE — 2500000002 HC RX 250 W HCPCS SELF ADMINISTERED DRUGS (ALT 637 FOR MEDICARE OP, ALT 636 FOR OP/ED): Performed by: EMERGENCY MEDICINE

## 2024-12-25 PROCEDURE — 81003 URINALYSIS AUTO W/O SCOPE: CPT | Mod: 59 | Performed by: NURSE PRACTITIONER

## 2024-12-25 PROCEDURE — 2500000002 HC RX 250 W HCPCS SELF ADMINISTERED DRUGS (ALT 637 FOR MEDICARE OP, ALT 636 FOR OP/ED): Performed by: STUDENT IN AN ORGANIZED HEALTH CARE EDUCATION/TRAINING PROGRAM

## 2024-12-25 PROCEDURE — 73030 X-RAY EXAM OF SHOULDER: CPT | Mod: LEFT SIDE | Performed by: STUDENT IN AN ORGANIZED HEALTH CARE EDUCATION/TRAINING PROGRAM

## 2024-12-25 PROCEDURE — 2500000001 HC RX 250 WO HCPCS SELF ADMINISTERED DRUGS (ALT 637 FOR MEDICARE OP): Performed by: STUDENT IN AN ORGANIZED HEALTH CARE EDUCATION/TRAINING PROGRAM

## 2024-12-25 PROCEDURE — 80307 DRUG TEST PRSMV CHEM ANLYZR: CPT | Performed by: NURSE PRACTITIONER

## 2024-12-25 PROCEDURE — 72125 CT NECK SPINE W/O DYE: CPT

## 2024-12-25 PROCEDURE — 73030 X-RAY EXAM OF SHOULDER: CPT | Mod: LT

## 2024-12-25 PROCEDURE — 70450 CT HEAD/BRAIN W/O DYE: CPT | Performed by: STUDENT IN AN ORGANIZED HEALTH CARE EDUCATION/TRAINING PROGRAM

## 2024-12-25 PROCEDURE — 82947 ASSAY GLUCOSE BLOOD QUANT: CPT

## 2024-12-25 PROCEDURE — 70450 CT HEAD/BRAIN W/O DYE: CPT

## 2024-12-25 PROCEDURE — 72125 CT NECK SPINE W/O DYE: CPT | Performed by: STUDENT IN AN ORGANIZED HEALTH CARE EDUCATION/TRAINING PROGRAM

## 2024-12-25 PROCEDURE — 96376 TX/PRO/DX INJ SAME DRUG ADON: CPT

## 2024-12-25 PROCEDURE — 73060 X-RAY EXAM OF HUMERUS: CPT | Mod: LEFT SIDE | Performed by: STUDENT IN AN ORGANIZED HEALTH CARE EDUCATION/TRAINING PROGRAM

## 2024-12-25 RX ORDER — PANTOPRAZOLE SODIUM 20 MG/1
20 TABLET, DELAYED RELEASE ORAL
Status: DISCONTINUED | OUTPATIENT
Start: 2024-12-26 | End: 2024-12-25

## 2024-12-25 RX ORDER — BUPRENORPHINE HYDROCHLORIDE 8 MG/1
8 TABLET SUBLINGUAL ONCE
Status: COMPLETED | OUTPATIENT
Start: 2024-12-25 | End: 2024-12-25

## 2024-12-25 RX ORDER — PANTOPRAZOLE SODIUM 40 MG/1
40 TABLET, DELAYED RELEASE ORAL ONCE
Status: COMPLETED | OUTPATIENT
Start: 2024-12-25 | End: 2024-12-25

## 2024-12-25 RX ORDER — HYDROMORPHONE HYDROCHLORIDE 1 MG/ML
1 INJECTION, SOLUTION INTRAMUSCULAR; INTRAVENOUS; SUBCUTANEOUS ONCE
Status: COMPLETED | OUTPATIENT
Start: 2024-12-25 | End: 2024-12-25

## 2024-12-25 RX ORDER — DEXAMETHASONE 6 MG/1
12 TABLET ORAL ONCE
Status: COMPLETED | OUTPATIENT
Start: 2024-12-25 | End: 2024-12-25

## 2024-12-25 RX ORDER — IBUPROFEN 200 MG
1 TABLET ORAL DAILY
Status: DISCONTINUED | OUTPATIENT
Start: 2024-12-25 | End: 2024-12-27 | Stop reason: HOSPADM

## 2024-12-25 RX ORDER — QUETIAPINE FUMARATE 100 MG/1
100 TABLET, FILM COATED ORAL NIGHTLY
Status: DISCONTINUED | OUTPATIENT
Start: 2024-12-25 | End: 2024-12-27 | Stop reason: HOSPADM

## 2024-12-25 RX ORDER — HYDROMORPHONE HYDROCHLORIDE 1 MG/ML
1 INJECTION, SOLUTION INTRAMUSCULAR; INTRAVENOUS; SUBCUTANEOUS ONCE
Status: DISCONTINUED | OUTPATIENT
Start: 2024-12-25 | End: 2024-12-25

## 2024-12-25 RX ADMIN — HYDROMORPHONE HYDROCHLORIDE 1 MG: 1 INJECTION, SOLUTION INTRAMUSCULAR; INTRAVENOUS; SUBCUTANEOUS at 05:54

## 2024-12-25 RX ADMIN — QUETIAPINE FUMARATE 100 MG: 100 TABLET ORAL at 00:39

## 2024-12-25 RX ADMIN — HYDROMORPHONE HYDROCHLORIDE 1 MG: 1 INJECTION, SOLUTION INTRAMUSCULAR; INTRAVENOUS; SUBCUTANEOUS at 10:31

## 2024-12-25 RX ADMIN — NICOTINE 1 PATCH: 14 PATCH, EXTENDED RELEASE TRANSDERMAL at 10:47

## 2024-12-25 RX ADMIN — DEXAMETHASONE 12 MG: 6 TABLET ORAL at 05:54

## 2024-12-25 RX ADMIN — HYDROMORPHONE HYDROCHLORIDE 0.5 MG: 0.5 INJECTION, SOLUTION INTRAMUSCULAR; INTRAVENOUS; SUBCUTANEOUS at 14:29

## 2024-12-25 RX ADMIN — BUPRENORPHINE 8 MG: 8 TABLET SUBLINGUAL at 21:08

## 2024-12-25 RX ADMIN — PANTOPRAZOLE SODIUM 40 MG: 40 TABLET, DELAYED RELEASE ORAL at 14:01

## 2024-12-25 RX ADMIN — QUETIAPINE FUMARATE 100 MG: 100 TABLET ORAL at 20:13

## 2024-12-25 SDOH — HEALTH STABILITY: MENTAL HEALTH: CONTENT: UNREMARKABLE

## 2024-12-25 SDOH — HEALTH STABILITY: MENTAL HEALTH: FOR HIGH RISK PATIENTS: ALL INTERVENTIONS ABOVE, PLUS:;1:1 PATIENT OBSERVER AT ALL TIMES

## 2024-12-25 SDOH — HEALTH STABILITY: MENTAL HEALTH: SUICIDE ASSESSMENT: ADULT (C-SSRS)

## 2024-12-25 SDOH — HEALTH STABILITY: MENTAL HEALTH: BEHAVIORAL HEALTH(WDL): EXCEPTIONS TO WDL

## 2024-12-25 SDOH — HEALTH STABILITY: MENTAL HEALTH: WAS THIS WITHIN THE PAST THREE MONTHS?: YES

## 2024-12-25 SDOH — HEALTH STABILITY: MENTAL HEALTH: BEHAVIORS/MOOD: ANXIOUS;COOPERATIVE

## 2024-12-25 SDOH — HEALTH STABILITY: MENTAL HEALTH: HAVE YOU ACTUALLY HAD ANY THOUGHTS OF KILLING YOURSELF?: YES

## 2024-12-25 SDOH — HEALTH STABILITY: MENTAL HEALTH: SLEEP PATTERN: RESTLESSNESS;EARLY AWAKENING

## 2024-12-25 SDOH — HEALTH STABILITY: MENTAL HEALTH: HAVE YOU HAD THESE THOUGHTS AND HAD SOME INTENTION OF ACTING ON THEM?: NO

## 2024-12-25 SDOH — HEALTH STABILITY: MENTAL HEALTH

## 2024-12-25 SDOH — HEALTH STABILITY: MENTAL HEALTH: BEHAVIORS/MOOD: AGITATED;COOPERATIVE

## 2024-12-25 SDOH — HEALTH STABILITY: MENTAL HEALTH: BEHAVIORS/MOOD: RESTLESS;COOPERATIVE;PACING

## 2024-12-25 SDOH — HEALTH STABILITY: MENTAL HEALTH: HAVE YOU WISHED YOU WERE DEAD OR WISHED YOU COULD GO TO SLEEP AND NOT WAKE UP?: YES

## 2024-12-25 SDOH — HEALTH STABILITY: MENTAL HEALTH: BEHAVIORS/MOOD: SLEEPING

## 2024-12-25 SDOH — HEALTH STABILITY: MENTAL HEALTH: HAVE YOU BEEN THINKING ABOUT HOW YOU MIGHT DO THIS?: YES

## 2024-12-25 SDOH — HEALTH STABILITY: MENTAL HEALTH: BEHAVIORS/MOOD: COOPERATIVE;RESTLESS

## 2024-12-25 SDOH — HEALTH STABILITY: MENTAL HEALTH: BEHAVIORS/MOOD: COOPERATIVE;CALM

## 2024-12-25 SDOH — HEALTH STABILITY: MENTAL HEALTH: DELUSIONS: OTHER (COMMENT)

## 2024-12-25 SDOH — HEALTH STABILITY: MENTAL HEALTH: BEHAVIORS/MOOD: CALM;COOPERATIVE

## 2024-12-25 SDOH — HEALTH STABILITY: MENTAL HEALTH: SLEEP PATTERN: EARLY AWAKENING;RESTLESSNESS

## 2024-12-25 SDOH — HEALTH STABILITY: MENTAL HEALTH: IN THE PAST FEW WEEKS, HAVE YOU WISHED YOU WERE DEAD?: YES

## 2024-12-25 SDOH — SOCIAL STABILITY: SOCIAL NETWORK: VISITOR BEHAVIORS: UNABLE TO ASSESS

## 2024-12-25 SDOH — HEALTH STABILITY: MENTAL HEALTH: HAVE YOU EVER DONE ANYTHING, STARTED TO DO ANYTHING, OR PREPARED TO DO ANYTHING TO END YOUR LIFE?: YES

## 2024-12-25 SDOH — HEALTH STABILITY: MENTAL HEALTH: BEHAVIORS/MOOD: COOPERATIVE

## 2024-12-25 SDOH — HEALTH STABILITY: MENTAL HEALTH: RISK OF SUICIDE: HIGH RISK

## 2024-12-25 SDOH — HEALTH STABILITY: MENTAL HEALTH: BEHAVIORS/MOOD: RESTLESS;COOPERATIVE

## 2024-12-25 SDOH — HEALTH STABILITY: MENTAL HEALTH: NEEDS EXPRESSED: EMOTIONAL

## 2024-12-25 SDOH — HEALTH STABILITY: MENTAL HEALTH: CONTENT: UNABLE TO ASSESS;OTHER (COMMENT)

## 2024-12-25 SDOH — HEALTH STABILITY: MENTAL HEALTH: HALLUCINATION: UNABLE TO ASSESS;OTHER (COMMENT)

## 2024-12-25 ASSESSMENT — PAIN SCALES - GENERAL
PAINLEVEL_OUTOF10: 5 - MODERATE PAIN
PAINLEVEL_OUTOF10: 8

## 2024-12-25 ASSESSMENT — PAIN DESCRIPTION - ORIENTATION: ORIENTATION: MID

## 2024-12-25 ASSESSMENT — PAIN DESCRIPTION - PAIN TYPE: TYPE: CHRONIC PAIN
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
## 2024-12-25 NOTE — PROGRESS NOTES
Emergency Medicine Transition of Care Note.    I received Herbert Keane in signout from Dr. Montano.  Please see the previous ED provider note for all HPI, PE and MDM up to the time of signout at 0700. This is in addition to the primary record.    In brief Herbert Keane is an 69 y.o. male presenting for   Chief Complaint   Patient presents with    Suicidal     At the time of signout we were awaiting: Placement    ED Course as of 12/29/24 0727   Mon Dec 23, 2024   1133 Noted to have an elevated WBC count. CXR ordered.  [JZ]   2307 Patient seen by EPAT who recommends inpatient stabilization for his suicidal ideation with plan [SG]   Wed Dec 25, 2024   0533 EPAT called with clarifying questions.  Patient has some chronic wounds on his left lateral thigh.  Those are healing well, no signs of any active cellulitis, no drainage or signs of purulence.  Patient is requesting pain medication.  Patient be given IM injection of hydromorphone.  Will also give steroids as well for his back pain and COVID. [JH]   1850 Patient slipped on water in his room.  Says he hurt his left arm.  He is unsure if he hit his head.  No chest pain, shortness of breath abdominal pain or neck pain.  Will obtain CT brain, C-spine and x-ray of the left shoulder and humerus [RS]      ED Course User Index  [JH] Torsten Montano MD  [JZ] Anuel Harley, APRN-CNP  [RS] Hawk Lee DO  [SG] Susanna Zimmerman MD         Diagnoses as of 12/29/24 0727   Depression with suicidal ideation   Other chronic pain       Medical Decision Making    69-year-old male signed out to me pending placement for suicidal ideations.  Was also incidentally found to be COVID-positive.  During my care patient he had a fall in the room due to slipping on his water.  Hit his left shoulder.  Is unsure if he hit his head.  I am obtaining a CT brain, C-spine and left shoulder x-ray.  At this time patient will be signed out to oncoming provider awaiting imaging results with plan to  continue to likely transfer patient for suicidal ideations.    Final diagnoses:   [F32.A, R42.422] Depression with suicidal ideation           Procedure  Procedures    Hawk Lee DO

## 2024-12-26 PROCEDURE — 2500000002 HC RX 250 W HCPCS SELF ADMINISTERED DRUGS (ALT 637 FOR MEDICARE OP, ALT 636 FOR OP/ED): Performed by: STUDENT IN AN ORGANIZED HEALTH CARE EDUCATION/TRAINING PROGRAM

## 2024-12-26 PROCEDURE — 99204 OFFICE O/P NEW MOD 45 MIN: CPT | Performed by: PSYCHIATRY & NEUROLOGY

## 2024-12-26 PROCEDURE — S4991 NICOTINE PATCH NONLEGEND: HCPCS | Performed by: STUDENT IN AN ORGANIZED HEALTH CARE EDUCATION/TRAINING PROGRAM

## 2024-12-26 PROCEDURE — 2500000002 HC RX 250 W HCPCS SELF ADMINISTERED DRUGS (ALT 637 FOR MEDICARE OP, ALT 636 FOR OP/ED): Mod: MUE | Performed by: EMERGENCY MEDICINE

## 2024-12-26 RX ORDER — BUPRENORPHINE HYDROCHLORIDE 8 MG/1
8 TABLET SUBLINGUAL EVERY 8 HOURS PRN
Status: DISCONTINUED | OUTPATIENT
Start: 2024-12-26 | End: 2024-12-27 | Stop reason: HOSPADM

## 2024-12-26 RX ADMIN — BUPRENORPHINE 8 MG: 8 TABLET SUBLINGUAL at 07:55

## 2024-12-26 RX ADMIN — BUPRENORPHINE 8 MG: 8 TABLET SUBLINGUAL at 16:16

## 2024-12-26 RX ADMIN — NICOTINE 1 PATCH: 14 PATCH, EXTENDED RELEASE TRANSDERMAL at 09:11

## 2024-12-26 RX ADMIN — QUETIAPINE FUMARATE 100 MG: 100 TABLET ORAL at 20:28

## 2024-12-26 SDOH — HEALTH STABILITY: MENTAL HEALTH: SUICIDE ASSESSMENT: ADULT (C-SSRS)

## 2024-12-26 SDOH — SOCIAL STABILITY: SOCIAL NETWORK: VISITOR BEHAVIORS: CALM;COOPERATIVE;SUPPORTIVE

## 2024-12-26 SDOH — HEALTH STABILITY: MENTAL HEALTH: HAVE YOU HAD THESE THOUGHTS AND HAD SOME INTENTION OF ACTING ON THEM?: NO

## 2024-12-26 SDOH — HEALTH STABILITY: MENTAL HEALTH: BEHAVIORS/MOOD: RESTLESS

## 2024-12-26 SDOH — HEALTH STABILITY: MENTAL HEALTH: BEHAVIORAL HEALTH(WDL): WITHIN DEFINED LIMITS

## 2024-12-26 SDOH — HEALTH STABILITY: MENTAL HEALTH: BEHAVIORS/MOOD: SLEEPING

## 2024-12-26 SDOH — HEALTH STABILITY: MENTAL HEALTH

## 2024-12-26 SDOH — HEALTH STABILITY: MENTAL HEALTH: SLEEP PATTERN: INSOMNIA

## 2024-12-26 SDOH — HEALTH STABILITY: MENTAL HEALTH: SLEEP PATTERN: INSOMNIA;DIFFICULTY FALLING ASLEEP

## 2024-12-26 SDOH — HEALTH STABILITY: MENTAL HEALTH: HAVE YOU ACTUALLY HAD ANY THOUGHTS OF KILLING YOURSELF?: YES

## 2024-12-26 SDOH — HEALTH STABILITY: MENTAL HEALTH: RISK OF SUICIDE: LOW RISK

## 2024-12-26 SDOH — HEALTH STABILITY: MENTAL HEALTH: BEHAVIORAL HEALTH(WDL): EXCEPTIONS TO WDL

## 2024-12-26 SDOH — HEALTH STABILITY: MENTAL HEALTH: SLEEP PATTERN: SLEEPS ALL NIGHT

## 2024-12-26 SDOH — HEALTH STABILITY: MENTAL HEALTH: BEHAVIORS/MOOD: CALM

## 2024-12-26 SDOH — HEALTH STABILITY: MENTAL HEALTH: IN THE PAST FEW WEEKS, HAVE YOU WISHED YOU WERE DEAD?: NO

## 2024-12-26 SDOH — HEALTH STABILITY: MENTAL HEALTH: BEHAVIORS/MOOD: CALM;COOPERATIVE

## 2024-12-26 SDOH — HEALTH STABILITY: MENTAL HEALTH: FOR HIGH RISK PATIENTS: ALL INTERVENTIONS ABOVE, PLUS:;1:1 PATIENT OBSERVER AT ALL TIMES

## 2024-12-26 SDOH — HEALTH STABILITY: MENTAL HEALTH: HAVE YOU EVER DONE ANYTHING, STARTED TO DO ANYTHING, OR PREPARED TO DO ANYTHING TO END YOUR LIFE?: NO

## 2024-12-26 SDOH — HEALTH STABILITY: MENTAL HEALTH: HAVE YOU BEEN THINKING ABOUT HOW YOU MIGHT DO THIS?: NO

## 2024-12-26 SDOH — HEALTH STABILITY: MENTAL HEALTH: BEHAVIORS/MOOD: IRRITABLE;RESTLESS

## 2024-12-26 SDOH — HEALTH STABILITY: MENTAL HEALTH: BEHAVIORS/MOOD: COOPERATIVE;CALM

## 2024-12-26 SDOH — SOCIAL STABILITY: SOCIAL NETWORK: PARENT/GUARDIAN/SIGNIFICANT OTHER INVOLVEMENT: ATTENTIVE TO PATIENT NEEDS

## 2024-12-26 SDOH — HEALTH STABILITY: MENTAL HEALTH: HAVE YOU WISHED YOU WERE DEAD OR WISHED YOU COULD GO TO SLEEP AND NOT WAKE UP?: YES

## 2024-12-26 SDOH — SOCIAL STABILITY: SOCIAL INSECURITY: FAMILY BEHAVIORS: CALM;COOPERATIVE;SUPPORTIVE

## 2024-12-26 SDOH — SOCIAL STABILITY: SOCIAL INSECURITY: FAMILY BEHAVIORS: COOPERATIVE;CALM;SUPPORTIVE

## 2024-12-26 SDOH — HEALTH STABILITY: MENTAL HEALTH: NEEDS EXPRESSED: DENIES

## 2024-12-26 SDOH — HEALTH STABILITY: MENTAL HEALTH: WAS THIS WITHIN THE PAST THREE MONTHS?: NO

## 2024-12-26 SDOH — HEALTH STABILITY: MENTAL HEALTH: BEHAVIORS/MOOD: AGITATED;IRRITABLE;HOSTILE

## 2024-12-26 SDOH — HEALTH STABILITY: MENTAL HEALTH: BEHAVIORS/MOOD: ANXIOUS;COOPERATIVE

## 2024-12-26 SDOH — HEALTH STABILITY: MENTAL HEALTH: FOR HIGH RISK PATIENTS: 1:1 PATIENT OBSERVER AT ALL TIMES;ALL INTERVENTIONS ABOVE, PLUS:

## 2024-12-26 SDOH — HEALTH STABILITY: MENTAL HEALTH: BEHAVIORS/MOOD: ANXIOUS;COOPERATIVE;CALM

## 2024-12-26 ASSESSMENT — COLUMBIA-SUICIDE SEVERITY RATING SCALE - C-SSRS
2. HAVE YOU ACTUALLY HAD ANY THOUGHTS OF KILLING YOURSELF?: NO
6. HAVE YOU EVER DONE ANYTHING, STARTED TO DO ANYTHING, OR PREPARED TO DO ANYTHING TO END YOUR LIFE?: NO
1. SINCE LAST CONTACT, HAVE YOU WISHED YOU WERE DEAD OR WISHED YOU COULD GO TO SLEEP AND NOT WAKE UP?: NO

## 2024-12-26 ASSESSMENT — PAIN SCALES - GENERAL: PAINLEVEL_OUTOF10: 7

## 2024-12-26 NOTE — PROGRESS NOTES
"Herbert Keane is a 69 y.o. male on day 4 of admission presenting with suicidal ideation.       Subjective   Upon interview, patient reports to be doing \"okay.\" He states that the conversation with his NP that is reported in the chart is \"not all accurate,\" as things are \"not as bad as what you heard,\" and that he was \"never suicidal.\" He reports that things have gotten better from Monday 12/23 until now. When questioned what has changed, he reports \"being with my daughter.\" He currently denies any SI or Hi. He reports he feels safe to go home, saying \"I'm ready to come home, everything is fine.\"        Mental Status Examination  General: No acute distress, laying in bed comfortably during interview  Appearance: Appeared stated age, appropriately dressed/groomed  Attitude: calm, cooperative    Behavior: appropriate eye contact  Motor Activity: No psychomotor agitation/retardation, no tics noted  Speech: Normal rate/rhythm/volume   Mood: \"okay\"  Affect: euthymic, mood congruent   Thought Process: linear, goal-directed  Thought Content: Denies SI, HI, intent or plan. No apparent delusions or paranoia   Perception: denies AH/VH, does not appear to be responding to internal stimuli  Cognition: grossly intact  Insight: Fair   Judgement: limited      Objective     Last Recorded Vitals  Blood pressure 165/84, pulse 86, temperature 36.7 °C (98 °F), temperature source Temporal, resp. rate 18, height 1.727 m (5' 8\"), weight 72.6 kg (160 lb), SpO2 98%.        Assessment/Plan   Assessment & Plan    Herbert Keane is a 69 y.o. male with a past psychiatric history of depression, anxiety and a past medical history of arthritis, presented to ED for suicide ideation on 12/23 . While patient currently denies any SI or plan and reports he was \"never suicidal,\" patient may not be fully forthcoming in an effort to expedite discharge. Given the severity of the case, including a previously reported suicidal plan, hopelessness, and " anhedonia, we continue to recommend inpatient psychiatric admission.     Plan  -Patient continues to meet criteria for inpatient psychiatric admission     Patient  discussed with Dr. Painting, who agrees with above plan.    Zacarias Zamarripa MD  Psychiatry, PGY-1  EPAT

## 2024-12-26 NOTE — PROGRESS NOTES
This progress note represents a emergency department transition note for signout of care.    Patient care was signed out to me. Please see the previous provider's notes for the full history and physical. Briefly, Herbert Keane is 69 y.o. male who had presented to the emergency department with suicidal ideation and tested positive for COVID.  Patient is awaiting medical clearance.  During my shift, there is no acute events.  Patient care signed out to the overnight physician pending EPAT placement.    Jorden Fofana DO  Emergency Medicine    ED Course as of 12/28/24 0156   Mon Dec 23, 2024   1133 Noted to have an elevated WBC count. CXR ordered.  [JZ]   2307 Patient seen by EPAT who recommends inpatient stabilization for his suicidal ideation with plan [SG]   Wed Dec 25, 2024   0533 EPAT called with clarifying questions.  Patient has some chronic wounds on his left lateral thigh.  Those are healing well, no signs of any active cellulitis, no drainage or signs of purulence.  Patient is requesting pain medication.  Patient be given IM injection of hydromorphone.  Will also give steroids as well for his back pain and COVID. [JH]   1850 Patient slipped on water in his room.  Says he hurt his left arm.  He is unsure if he hit his head.  No chest pain, shortness of breath abdominal pain or neck pain.  Will obtain CT brain, C-spine and x-ray of the left shoulder and humerus [RS]      ED Course User Index  [JH] Torsten Montano MD  [JZ] Anuel Harley, APRN-CNP  [RS] Hawk Lee DO  [SG] Susanna Zimmerman MD         Diagnoses as of 12/28/24 0156   Depression with suicidal ideation   Other chronic pain

## 2024-12-27 VITALS
HEIGHT: 68 IN | HEART RATE: 72 BPM | BODY MASS INDEX: 24.25 KG/M2 | DIASTOLIC BLOOD PRESSURE: 81 MMHG | TEMPERATURE: 98.8 F | SYSTOLIC BLOOD PRESSURE: 128 MMHG | WEIGHT: 160 LBS | RESPIRATION RATE: 18 BRPM | OXYGEN SATURATION: 98 %

## 2024-12-27 LAB
ATRIAL RATE: 85 BPM
P AXIS: 53 DEGREES
PR INTERVAL: 153 MS
Q ONSET: 252 MS
QRS COUNT: 14 BEATS
QRS DURATION: 88 MS
QT INTERVAL: 330 MS
QTC CALCULATION(BAZETT): 393 MS
QTC FREDERICIA: 370 MS
R AXIS: -47 DEGREES
T AXIS: 51 DEGREES
T OFFSET: 417 MS
VENTRICULAR RATE: 85 BPM

## 2024-12-27 PROCEDURE — 2500000002 HC RX 250 W HCPCS SELF ADMINISTERED DRUGS (ALT 637 FOR MEDICARE OP, ALT 636 FOR OP/ED): Performed by: STUDENT IN AN ORGANIZED HEALTH CARE EDUCATION/TRAINING PROGRAM

## 2024-12-27 PROCEDURE — S4991 NICOTINE PATCH NONLEGEND: HCPCS | Performed by: STUDENT IN AN ORGANIZED HEALTH CARE EDUCATION/TRAINING PROGRAM

## 2024-12-27 PROCEDURE — 99215 OFFICE O/P EST HI 40 MIN: CPT | Performed by: PSYCHIATRY & NEUROLOGY

## 2024-12-27 RX ORDER — BUPRENORPHINE 2 MG/1
TABLET SUBLINGUAL
Qty: 6 TABLET | Refills: 0 | Status: SHIPPED | OUTPATIENT
Start: 2024-12-27 | End: 2024-12-29

## 2024-12-27 RX ADMIN — NICOTINE 1 PATCH: 14 PATCH, EXTENDED RELEASE TRANSDERMAL at 10:44

## 2024-12-27 SDOH — HEALTH STABILITY: MENTAL HEALTH: FOR HIGH RISK PATIENTS: ALL INTERVENTIONS ABOVE, PLUS:;1:1 PATIENT OBSERVER AT ALL TIMES

## 2024-12-27 SDOH — HEALTH STABILITY: MENTAL HEALTH: FOR HIGH RISK PATIENTS: 1:1 PATIENT OBSERVER AT ALL TIMES

## 2024-12-27 SDOH — HEALTH STABILITY: MENTAL HEALTH

## 2024-12-27 SDOH — HEALTH STABILITY: MENTAL HEALTH: BEHAVIORAL HEALTH(WDL): EXCEPTIONS TO WDL

## 2024-12-27 SDOH — HEALTH STABILITY: MENTAL HEALTH: SLEEP PATTERN: SLEEPS ALL NIGHT

## 2024-12-27 SDOH — HEALTH STABILITY: MENTAL HEALTH: BEHAVIORS/MOOD: SLEEPING

## 2024-12-27 SDOH — HEALTH STABILITY: MENTAL HEALTH: BEHAVIORS/MOOD: CALM;IRRITABLE

## 2024-12-27 SDOH — HEALTH STABILITY: MENTAL HEALTH: NEEDS EXPRESSED: DENIES

## 2024-12-27 SDOH — HEALTH STABILITY: MENTAL HEALTH: BEHAVIORS/MOOD: COOPERATIVE;CALM

## 2024-12-27 SDOH — HEALTH STABILITY: MENTAL HEALTH: BEHAVIORS/MOOD: CALM

## 2024-12-27 SDOH — HEALTH STABILITY: MENTAL HEALTH: CONTENT: UNREMARKABLE

## 2024-12-27 SDOH — HEALTH STABILITY: MENTAL HEALTH: BEHAVIORS/MOOD: IRRITABLE;CALM

## 2024-12-27 NOTE — PROGRESS NOTES
Emergency Medicine Transition of Care Note.    I received Herbert Keane in signout from Dr. Fofana.  Please see the previous ED provider note for all HPI, PE and MDM up to the time of signout at 0100. This is in addition to the primary record.    In brief Herbert Keane is an 69 y.o. male presenting for   Chief Complaint   Patient presents with    Suicidal     At the time of signout we were awaiting: psychiatric placment    ED Course as of 12/30/24 1610   Mon Dec 23, 2024   1133 Noted to have an elevated WBC count. CXR ordered.  [JZ]   2307 Patient seen by EPAT who recommends inpatient stabilization for his suicidal ideation with plan [SG]   Wed Dec 25, 2024   0533 EPAT called with clarifying questions.  Patient has some chronic wounds on his left lateral thigh.  Those are healing well, no signs of any active cellulitis, no drainage or signs of purulence.  Patient is requesting pain medication.  Patient be given IM injection of hydromorphone.  Will also give steroids as well for his back pain and COVID. [JH]   1850 Patient slipped on water in his room.  Says he hurt his left arm.  He is unsure if he hit his head.  No chest pain, shortness of breath abdominal pain or neck pain.  Will obtain CT brain, C-spine and x-ray of the left shoulder and humerus [RS]      ED Course User Index  [JH] Torsten Montano MD  [JZ] Anuel Harley, APRN-CNP  [RS] Hawk Lee DO  [SG] Susanna Zimmerman MD         Diagnoses as of 12/30/24 1610   Depression with suicidal ideation   Other chronic pain       Medical Decision Making  No new concerns were raised while under my care.  The patient was signed out to the oncoming physician pending psychiatric placement.    Final diagnoses:   [F32.A, R45.851] Depression with suicidal ideation           Procedure  Procedures    Thelma Nam DO

## 2024-12-27 NOTE — PROGRESS NOTES
I assumed care of this patient at change of shift.  Patient has been followed by psychiatry daily please refer to their consultation and progress notes for full details.  Patient no longer acutely suicidal was able to contract for safety prescription for buprenorphine sent to the pharmacy as of the request per the discharging psychiatric team out of abundance of precaution that the patient does not go into opiate withdrawal however there is low concern that he actually well and so it was okay for him to be discharged without having the medications patient daughter will be contacted for safe ride and the patient was discharged home with close outpatient follow-up.

## 2024-12-27 NOTE — PROGRESS NOTES
"Herbert Keane is a 69 y.o. male on day 5 of admission presenting with suicidal ideation.       Subjective   Upon interview, patient reports to be doing \"good.\" He endorses neck and lower pain back, rating it as a 9/10. Patient reports \"I feel pretty good now,\" but is unable to elaborate on what has changed from Monday to today, saying \"I just feel better.\" Patient is irritated that he is still in the hospital, saying \"Why am I here for so many days now, I don't need to be here.\" He continues to deny suicidal thoughts and says his NP misunderstood what he was saying, and clarifies that \"I don't have suicidal thoughts in the present, I had them in the past.\" Patient reports he has good social support from his daughter, and would like to be discharged to see her and his grandkids.     Called patients daughter Christiano Patelelysenaomi ([phone]) with Dr. Lundy -- She believes patient is safe for discharge. Patient reports she knows her father more than anyone and says \"I believe it is in his best interest to come home.\" She reports that him being in the hospital is doing more harm than good and that its time for him to get back to his routine. Daughter reports his last suicide attempt was about 20 years ago and that patient has not recently endorsed any suicidal thoughts to her. Daughter states \"I would not say anything to jeopardize his health. Patient lives with his daughter who is his primary care taker.        Mental Status Examination  General: No acute distress, laying in bed comfortably during interview  Appearance: Appeared stated age, appropriately dressed/groomed  Attitude: calm, cooperative    Behavior: appropriate eye contact  Motor Activity: No psychomotor agitation/retardation, no tics noted  Speech: Normal rate/rhythm/volume   Mood: \"good\"  Affect: euthymic, mood congruent   Thought Process: linear, goal-directed  Thought Content: Denies SI, HI, intent or plan. No apparent delusions or paranoia   Perception: " "denies AH/VH, does not appear to be responding to internal stimuli  Cognition: grossly intact  Insight: Fair   Judgement: limited      Objective     Last Recorded Vitals  Blood pressure 156/83, pulse 97, temperature 36.4 °C (97.5 °F), resp. rate 16, height 1.727 m (5' 8\"), weight 72.6 kg (160 lb), SpO2 98%.        Assessment/Plan   Assessment & Plan    Herbert Keane is a 69 y.o. male with a past psychiatric history of depression, anxiety and a past medical history of arthritis, presented to ED for suicide ideation on 12/23 . Based on patients continual denial of suicidal thoughts and daughter's corroboration that he is safe for discharge, Mr. Keane will no longer be psychiatrically hospitalized as he does not meet criteria for inpatient admission and will be discharged home today.     Plan  -Discharge home today 12/27   -Please discharge patient with buprenorphine 4 mg BID x 1 day, 2 mg BID x 1 day then discontinue it     Patient  discussed with Dr. Painting, who agrees with above plan.    Zacarias Zamarripa MD  Psychiatry, PGY-1  EPAT  "

## 2024-12-27 NOTE — PROGRESS NOTES
.  Patient is medically cleared for inpatient psychiatric care.  He tested positive for COVID on December 23 but has minimal symptoms and it has been several days and he has been hemodynamically stable.

## 2025-05-09 ENCOUNTER — HOSPITAL ENCOUNTER (OUTPATIENT)
Dept: RADIOLOGY | Facility: HOSPITAL | Age: 70
Discharge: HOME | End: 2025-05-09
Payer: MEDICARE

## 2025-05-09 DIAGNOSIS — M54.14 RADICULOPATHY, THORACIC REGION: ICD-10-CM

## 2025-05-09 PROCEDURE — 72072 X-RAY EXAM THORAC SPINE 3VWS: CPT

## 2025-05-20 ENCOUNTER — TELEPHONE (OUTPATIENT)
Facility: CLINIC | Age: 70
End: 2025-05-20
Payer: MEDICARE

## 2025-05-20 NOTE — TELEPHONE ENCOUNTER
Received are referral for a Screening Colonoscopy [Z12.11] by ELENA Calles-CNP- Order has not yet been placed from Paper referral    Left message on Machine to return call for Open Access Colonoscopy Screening Form.

## 2025-07-23 ENCOUNTER — APPOINTMENT (OUTPATIENT)
Dept: PRIMARY CARE | Facility: CLINIC | Age: 70
End: 2025-07-23
Payer: MEDICARE